# Patient Record
(demographics unavailable — no encounter records)

---

## 2024-11-11 NOTE — PAST MEDICAL HISTORY
[Postmenopausal] : postmenopausal [Menopause Age____] : age at menopause was [unfilled] [Definite ___ (Date)] : the last menstrual period was [unfilled] [Total Preg ___] : G[unfilled] [Full Term ___] : Full Term: [unfilled] [Living ___] : Living: [unfilled] [FreeTextEntry1] : hyysterectomy

## 2024-11-11 NOTE — PHYSICAL EXAM
[No Acute Distress] : no acute distress [Well Nourished] : well nourished [Well Developed] : well developed [Well-Appearing] : well-appearing [Normal Sclera/Conjunctiva] : normal sclera/conjunctiva [PERRL] : pupils equal round and reactive to light [EOMI] : extraocular movements intact [Normal Outer Ear/Nose] : the outer ears and nose were normal in appearance [Normal Oropharynx] : the oropharynx was normal [Normal Nasal Mucosa] : the nasal mucosa was normal [No JVD] : no jugular venous distention [No Lymphadenopathy] : no lymphadenopathy [Supple] : supple [Thyroid Normal, No Nodules] : the thyroid was normal and there were no nodules present [No Respiratory Distress] : no respiratory distress  [No Accessory Muscle Use] : no accessory muscle use [Clear to Auscultation] : lungs were clear to auscultation bilaterally [Normal Rate] : normal rate  [Regular Rhythm] : with a regular rhythm [Normal S1, S2] : normal S1 and S2 [No Murmur] : no murmur heard [No Carotid Bruits] : no carotid bruits [No Abdominal Bruit] : a ~M bruit was not heard ~T in the abdomen [Pedal Pulses Present] : the pedal pulses are present [No Edema] : there was no peripheral edema [No Palpable Aorta] : no palpable aorta [No Extremity Clubbing/Cyanosis] : no extremity clubbing/cyanosis [Declined Breast Exam] : declined breast exam  [Soft] : abdomen soft [Non Tender] : non-tender [Non-distended] : non-distended [No Masses] : no abdominal mass palpated [No HSM] : no HSM [Normal Bowel Sounds] : normal bowel sounds [Declined Rectal Exam] : declined rectal exam [Normal Posterior Cervical Nodes] : no posterior cervical lymphadenopathy [Normal Anterior Cervical Nodes] : no anterior cervical lymphadenopathy [No CVA Tenderness] : no CVA  tenderness [No Spinal Tenderness] : no spinal tenderness [No Joint Swelling] : no joint swelling [Grossly Normal Strength/Tone] : grossly normal strength/tone [No Rash] : no rash [Coordination Grossly Intact] : coordination grossly intact [No Focal Deficits] : no focal deficits [Normal Gait] : normal gait [Deep Tendon Reflexes (DTR)] : deep tendon reflexes were 2+ and symmetric [Speech Grossly Normal] : speech grossly normal [Normal Affect] : the affect was normal [Alert and Oriented x3] : oriented to person, place, and time [Normal Mood] : the mood was normal [Normal Insight/Judgement] : insight and judgment were intact [de-identified] : B/l  cerumen impaction, right  [de-identified] : as per gyn Dr. Vera  [FreeTextEntry1] :  as per gynecology [de-identified] :  as per gynecology

## 2024-11-11 NOTE — ASSESSMENT
[FreeTextEntry1] : Mrs. SALAZAR is a 56 year female, with a past medical history as noted above, who present to the office today for medication renewal and fasting labs

## 2024-11-11 NOTE — HISTORY OF PRESENT ILLNESS
[FreeTextEntry1] : Fasting labs, medication renewal  [de-identified] : Ms. SALAZAR is a 57 year-old  female, with a past medical history as noted below, who present to the office today for fasting blood work.   Doing well overall.  Only concerns is a decrease in her hearing.   Since last visit not on Wegovy and rash and neck pain resolved  Believes she completed Mammo and thyroid sono w/in the year

## 2024-11-11 NOTE — HEALTH RISK ASSESSMENT
[Yes] : Yes [Monthly or less (1 pt)] : Monthly or less (1 point) [1 or 2 (0 pts)] : 1 or 2 (0 points) [Never (0 pts)] : Never (0 points) [No] : In the past 12 months have you used drugs other than those required for medical reasons? No [No falls in past year] : Patient reported no falls in the past year [0] : 2) Feeling down, depressed, or hopeless: Not at all (0) [PHQ-2 Negative - No further assessment needed] : PHQ-2 Negative - No further assessment needed [Never] : Never [Patient reported mammogram was normal] : Patient reported mammogram was normal [Patient reported colonoscopy was normal] : Patient reported colonoscopy was normal [HIV test declined] : HIV test declined [Hepatitis C test declined] : Hepatitis C test declined [None] : None [With Family] : lives with family [# of Members in Household ___] :  household currently consist of [unfilled] member(s) [Employed] : employed [College] : College [] :  [# Of Children ___] : has [unfilled] children [Feels Safe at Home] : Feels safe at home [Fully functional (bathing, dressing, toileting, transferring, walking, feeding)] : Fully functional (bathing, dressing, toileting, transferring, walking, feeding) [Fully functional (using the telephone, shopping, preparing meals, housekeeping, doing laundry, using] : Fully functional and needs no help or supervision to perform IADLs (using the telephone, shopping, preparing meals, housekeeping, doing laundry, using transportation, managing medications and managing finances) [Reports changes in vision] : Reports changes in vision [Smoke Detector] : smoke detector [Carbon Monoxide Detector] : carbon monoxide detector [Seat Belt] :  uses seat belt [de-identified] : GYN  Dr. Vera  [Audit-CScore] : 1 [de-identified] : walker  [de-identified] : Regular  [[id number]] : 0 [Language] : denies difficulty with language [MammogramDate] : 03/23 [MammogramComments] : [facility]  [PapSmearComments] : Dr Vera  [BoneDensityComments] : none  [ColonoscopyDate] : 07/20 [ColonoscopyComments] :  internal hemorrhoids repeat in 5 years  July 2025 [HIVDate] : 06/23 [de-identified] : blurry  [HepatitisCDate] : 06/23

## 2024-11-11 NOTE — PLAN
[FreeTextEntry1] : Cardiopulmonary  -Screening for Hyperlipidemia-check fasting lipid panel.  Will discuss further pending ancillary testing.  Endo   - Adult BMI screening and follow up: Patient was significantly lost since last visit. The patient's BMI is about 20. Counseled patient regarding BMI, healthy eating, portion control and exercise importance of diet to maintain a healthy weight.  Counseled patient on importance of exercise to maintain a healthy weight.  Endocrinology-history of thyroid nodule-continue to monitor thyroid sonogram.  Check TSH free T4.  Patient aware of recommendations. Request copy of sono if completed  ENT b/l cerumen impaction - advised debrox for 5 days than RTO for irrigation   GYN - advised patient follow-up gynecology for routine breast exam and clinical Pap smear.  Also, to follow-up regarding her perimenopausal menopausal symptoms.  Request to have a mammogram report sent to our office.  Gastro - GERD- Advise low acidic diet.    rheum  -  Request recent copy of her bone density scan Dermatology DAWIT was encouraged to wear sun protective clothing, sun block, and proper use of SPF board brim hats, to seek shade and to avoid the sun in peak hours.  ABCD of skin moles was advised.   I spent 31 Minutes with the patient, half of which we discussed finding on physical exam and coordinated care.  As well as reviewed my plans and follow ups. Dragon speech recognition software was used to create portions of this document.  An attempt at proofreading has been made to minimize errors please call if any questions arise.

## 2024-12-13 NOTE — ASSESSMENT
[FreeTextEntry1] : Deidra White presents for evaluation. She has bilateral otitis externa from previous attempted cerumen removal. She also has cellulitis of the left external auditory meatus.  - start ciprodex-  5 drops BID to both ears for 1 week. - mupirocin ointment BID to left external auditory meatus x 10 days - dry ear precautions for 1 week. - f/u prn

## 2024-12-13 NOTE — HISTORY OF PRESENT ILLNESS
[de-identified] : Yolanda White is a 57-year-old female who was referred by SARAH Mirza for initial evaluation of bilateral otalgia, left worse than right. These symptoms occurred after bilateral cerumen impaction removal procedure 2 weeks ago. She had bleeding after this attempted removal. Cotton was placed and this was removed by another urgent care. She continues to have otalgia. She denies otorrhea. She denies hearing loss, tinnitus, or vertigo. She denies recurrent ear infection or recent fever. She denies Q-tip use.

## 2024-12-13 NOTE — PHYSICAL EXAM
[Midline] : trachea located in midline position [Normal] : no rashes [de-identified] : Crusting and erythema of bilateral EAC. Inflammation of left external auditory meatus.

## 2024-12-13 NOTE — CONSULT LETTER
[Dear  ___] : Dear  [unfilled], [Consult Letter:] : I had the pleasure of evaluating your patient, [unfilled]. [Please see my note below.] : Please see my note below. [Consult Closing:] : Thank you very much for allowing me to participate in the care of this patient.  If you have any questions, please do not hesitate to contact me. [Sincerely,] : Sincerely, [FreeTextEntry3] : Steve Cantu MD

## 2025-01-24 NOTE — ADDENDUM
[FreeTextEntry1] : Received endoscopy and colonoscopy report after patient left.  Colonoscopy showed hemorrhoids recommended repeat in 5 years so is due in July 2025\par  \par   endoscopy was done in August 2022  had a gastric polyp removed recommend follow back up with gastroenterology also his consult report recommended following up with pH probe unaware patient has done we will have my nurse reach out to the patient and advised to follow-up with GI and to consider the GI probe for the GERD\par  \par  NT left cerumen impaction advised patient to use DebroxIf no improvement return to office for reevaluation and consider ear irrigation

## 2025-01-29 NOTE — PHYSICAL EXAM
[No Acute Distress] : no acute distress [Well Nourished] : well nourished [Well Developed] : well developed [Well-Appearing] : well-appearing [Normal Sclera/Conjunctiva] : normal sclera/conjunctiva [PERRL] : pupils equal round and reactive to light [EOMI] : extraocular movements intact [Normal Outer Ear/Nose] : the outer ears and nose were normal in appearance [Normal Oropharynx] : the oropharynx was normal [Normal Nasal Mucosa] : the nasal mucosa was normal [No JVD] : no jugular venous distention [No Lymphadenopathy] : no lymphadenopathy [Supple] : supple [Thyroid Normal, No Nodules] : the thyroid was normal and there were no nodules present [No Respiratory Distress] : no respiratory distress  [No Accessory Muscle Use] : no accessory muscle use [Clear to Auscultation] : lungs were clear to auscultation bilaterally [Normal Rate] : normal rate  [Regular Rhythm] : with a regular rhythm [Normal S1, S2] : normal S1 and S2 [No Murmur] : no murmur heard [No Carotid Bruits] : no carotid bruits [No Abdominal Bruit] : a ~M bruit was not heard ~T in the abdomen [Pedal Pulses Present] : the pedal pulses are present [No Edema] : there was no peripheral edema [No Palpable Aorta] : no palpable aorta [No Extremity Clubbing/Cyanosis] : no extremity clubbing/cyanosis [Soft] : abdomen soft [Non Tender] : non-tender [Non-distended] : non-distended [No Masses] : no abdominal mass palpated [No HSM] : no HSM [Normal Bowel Sounds] : normal bowel sounds [Normal Posterior Cervical Nodes] : no posterior cervical lymphadenopathy [Normal Anterior Cervical Nodes] : no anterior cervical lymphadenopathy [No CVA Tenderness] : no CVA  tenderness [No Spinal Tenderness] : no spinal tenderness [No Joint Swelling] : no joint swelling [Grossly Normal Strength/Tone] : grossly normal strength/tone [No Rash] : no rash [Coordination Grossly Intact] : coordination grossly intact [No Focal Deficits] : no focal deficits [Normal Gait] : normal gait [Deep Tendon Reflexes (DTR)] : deep tendon reflexes were 2+ and symmetric [Speech Grossly Normal] : speech grossly normal [Normal Affect] : the affect was normal [Alert and Oriented x3] : oriented to person, place, and time [Normal Mood] : the mood was normal [Normal Insight/Judgement] : insight and judgment were intact [de-identified] :   Left cerumen impaction, right TM panic membrane intact [Normal TMs] : both tympanic membranes were normal [FreeTextEntry1] :  as per gynecology [de-identified] :  as per gynecology

## 2025-01-29 NOTE — DATA REVIEWED
[FreeTextEntry1] : EKG   normal sinus rhythm  reviewed prior labs Reviewed mammogram, breast sonogram Reviewed thyroid sono done 11/23 reviewed abd sono  12/23

## 2025-01-29 NOTE — ASSESSMENT
[FreeTextEntry1] : Ms. SALAZAR is a 57 year-old female, with a past medical history as noted above, who present to the office today for physical exam and evaluation of abdominal pain

## 2025-01-29 NOTE — REVIEW OF SYSTEMS
[Hot Flashes] : hot flashes [Night Sweats] : night sweats [Vision Problems] : vision problems [Cough] : cough [Nocturia] : nocturia [Easy Bleeding] : easy bleeding [Negative] : Heme/Lymph [Fever] : no fever [Chills] : no chills [Fatigue] : no fatigue [Pain] : no pain [Itching] : no itching [Earache] : no earache [Nosebleed] : no nosebleeds [Nasal Discharge] : no nasal discharge [Sore Throat] : no sore throat [Chest Pain] : no chest pain [Palpitations] : no palpitations [Leg Claudication] : no leg claudication [Lower Ext Edema] : no lower extremity edema [Orthopnea] : no orthopnea [Shortness Of Breath] : no shortness of breath [Wheezing] : no wheezing [Abdominal Pain] : abdominal pain [Nausea] : no nausea [Constipation] : no constipation [Diarrhea] : diarrhea [Vomiting] : no vomiting [Heartburn] : no heartburn [Melena] : no melena [Dysuria] : no dysuria [Incontinence] : no incontinence [Hematuria] : no hematuria [Frequency] : no frequency [Joint Stiffness] : no joint stiffness [Joint Swelling] : no joint swelling [Muscle Pain] : no muscle pain [Back Pain] : no back pain [Itching] : no itching [Mole Changes] : no mole changes [Skin Rash] : no skin rash [Headache] : no headache [Dizziness] : no dizziness [Fainting] : no fainting [Confusion] : no confusion [Memory Loss] : no memory loss [Anxiety] : no anxiety [Depression] : no depression [Easy Bruising] : no easy bruising [Swollen Glands] : no swollen glands [FreeTextEntry6] : slight cough

## 2025-01-29 NOTE — PLAN
[FreeTextEntry1] : Cardiopulmonary  -Screening for coronary artery disease -     We reviewed and discussed the EKG.   Patient was advised the importance of participating in aerobic exercise programs improve their stamina.  DAWIT was encouraged to start an exercise program.   Check fasting lipid panel.  5 minutes were spent administering an evidence-based ASCVD risk assessment tool showing patient's risk being calculated as 1.56% and discussed the results with the patient including lifestyle modifications to be taken as well as treatment options with statin medications.   Endo   - Adult BMI screening and followup:  The patient's BMI is about 21. Counseled patient regarding BMI, healthy eating, portion control and exercise importance of diet to maintain a healthy weight.  Counseled patient on importance of exercise to maintain a healthy weight. d/c ozempic  advised BMI normal and concerns abd pain from GLP  Hematology  -  low vitamin B12 on PPIs intermittently.   check vitamin B12 level.  Advised to take over-the-counter vitamin B12  Endo   Hypothyroidism-check thyroid panel.  Check thyroid sonogram.  GYN -  advised patient follow-up gynecology for routine breast exam and clinical Pap smear.  Also to follow-up regarding her perimenopausal menopausal symptoms. Check mammogram yearly  Rheum - Evaluation for osteoporosis - Check BS - Advised weight bearing exercise - Check vitamin d levels    -  hematuria check urine culture and we will discuss further pending ancillary testing if urine culture is negative consider renal sonogram. GI -   Reviewed colonoscopy  Advised repeat summer of 2025 - Follow up with GI  Gastro - GERD Given the Patient Back As Reflux.  Should Avoid Alcohol as well as Anti-Inflammatories.  Avoid Eating 3 Hours Prior to Laying down.  Should Also Avoid Citric Juices, Cigarettes, Chocolate, Tight fitting Clothing, Coffee, and Other Caffeinated Beverages, Carbonated Beverages, Fatty and Fried Foods, Anticholinergic Medications, Medications with Decrease LES Tone like Calcium Channel Blockers.   discussed the use of PPI versus H2 blockers.   Follow up with GI Refilled lansoprazole   Rheum  -   Screening for osteoporosis.  Patient given a prescription for bone density scan.  advised importance of calcium and vitamin D as well as weightbearing exercise.  Dermatology DAWIT was encouraged to wear sun protective clothing, sun block, and proper use of SPF board brim hats, to seek shade and to avoid the sun in peak hours.  ABCD of skin moles was advised.   Dragon speech recognition software was used to create portions of this document.  An attempt at proofreading has been made to minimize errors please call if any questions arise.   Shingrix - discussed, patient to determine if vaccine is covered under medical benefits of current insurance plan and follow up for 1st dose if interested; otherwise, instructed to follow up at the pharmacy for vaccine   patient unaware if she had the chickenpox or  Varivax vaccine check varicella IgG

## 2025-01-29 NOTE — COUNSELING
[AUDIT-C Screening administered and reviewed] : AUDIT-C Screening administered and reviewed [Encouraged to increase physical activity] : Encouraged to increase physical activity [Decrease Portions] : decrease portions [Good understanding] : Patient has a good understanding of disease, goals and obesity follow-up plan [FreeTextEntry2] :  discussed a low caloric diet

## 2025-01-29 NOTE — HISTORY OF PRESENT ILLNESS
[FreeTextEntry1] : Physical exam  [de-identified] : Mrs. SALAZAR is a 57-year-old-female, with a past medical history as noted below, who present to the office today for physical. States she been having abdominal pain.  Pain mostly upper abdomen - Unaware of what makes it better or worse. Denies having any blood in the stools. Denies change in BM.  Was on GLP! for weight loss

## 2025-01-29 NOTE — PHYSICAL EXAM
[No Acute Distress] : no acute distress [Well Nourished] : well nourished [Well Developed] : well developed [Well-Appearing] : well-appearing [Normal Sclera/Conjunctiva] : normal sclera/conjunctiva [PERRL] : pupils equal round and reactive to light [EOMI] : extraocular movements intact [Normal Outer Ear/Nose] : the outer ears and nose were normal in appearance [Normal Oropharynx] : the oropharynx was normal [Normal Nasal Mucosa] : the nasal mucosa was normal [No JVD] : no jugular venous distention [No Lymphadenopathy] : no lymphadenopathy [Supple] : supple [Thyroid Normal, No Nodules] : the thyroid was normal and there were no nodules present [No Respiratory Distress] : no respiratory distress  [No Accessory Muscle Use] : no accessory muscle use [Clear to Auscultation] : lungs were clear to auscultation bilaterally [Normal Rate] : normal rate  [Regular Rhythm] : with a regular rhythm [Normal S1, S2] : normal S1 and S2 [No Murmur] : no murmur heard [No Carotid Bruits] : no carotid bruits [No Abdominal Bruit] : a ~M bruit was not heard ~T in the abdomen [Pedal Pulses Present] : the pedal pulses are present [No Edema] : there was no peripheral edema [No Palpable Aorta] : no palpable aorta [No Extremity Clubbing/Cyanosis] : no extremity clubbing/cyanosis [Soft] : abdomen soft [Non Tender] : non-tender [Non-distended] : non-distended [No Masses] : no abdominal mass palpated [No HSM] : no HSM [Normal Bowel Sounds] : normal bowel sounds [Normal Posterior Cervical Nodes] : no posterior cervical lymphadenopathy [Normal Anterior Cervical Nodes] : no anterior cervical lymphadenopathy [No CVA Tenderness] : no CVA  tenderness [No Spinal Tenderness] : no spinal tenderness [No Joint Swelling] : no joint swelling [Grossly Normal Strength/Tone] : grossly normal strength/tone [No Rash] : no rash [Coordination Grossly Intact] : coordination grossly intact [No Focal Deficits] : no focal deficits [Normal Gait] : normal gait [Deep Tendon Reflexes (DTR)] : deep tendon reflexes were 2+ and symmetric [Speech Grossly Normal] : speech grossly normal [Normal Affect] : the affect was normal [Alert and Oriented x3] : oriented to person, place, and time [Normal Mood] : the mood was normal [Normal Insight/Judgement] : insight and judgment were intact [de-identified] :   Left cerumen impaction, right TM panic membrane intact [Normal TMs] : both tympanic membranes were normal [FreeTextEntry1] :  as per gynecology [de-identified] :  as per gynecology

## 2025-01-29 NOTE — HEALTH RISK ASSESSMENT
[Fair] : ~his/her~ current health as fair  [Good] : ~his/her~  mood as  good [No Retinopathy] : No retinopathy [Yes] : Yes [Monthly or less (1 pt)] : Monthly or less (1 point) [1 or 2 (0 pts)] : 1 or 2 (0 points) [Never (0 pts)] : Never (0 points) [No] : In the past 12 months have you used drugs other than those required for medical reasons? No [0] : 2) Feeling down, depressed, or hopeless: Not at all (0) [PHQ-2 Negative - No further assessment needed] : PHQ-2 Negative - No further assessment needed [Never] : Never [Patient reported mammogram was normal] : Patient reported mammogram was normal [Patient reported colonoscopy was normal] : Patient reported colonoscopy was normal [HIV test declined] : HIV test declined [Hepatitis C test declined] : Hepatitis C test declined [With Family] : lives with family [College] : College [] :  [# Of Children ___] : has [unfilled] children [Feels Safe at Home] : Feels safe at home [Carbon Monoxide Detector] : carbon monoxide detector [None] : None [# of Members in Household ___] :  household currently consist of [unfilled] member(s) [Employed] : employed [Fully functional (bathing, dressing, toileting, transferring, walking, feeding)] : Fully functional (bathing, dressing, toileting, transferring, walking, feeding) [Fully functional (using the telephone, shopping, preparing meals, housekeeping, doing laundry, using] : Fully functional and needs no help or supervision to perform IADLs (using the telephone, shopping, preparing meals, housekeeping, doing laundry, using transportation, managing medications and managing finances) [Smoke Detector] : smoke detector [Seat Belt] :  uses seat belt [One fall no injury in past year] : Patient reported one fall in the past year without injury [FreeTextEntry1] : Abdominal pain  [de-identified] : GYN  Dr. Chuy GÓMEZ  [Audit-CScore] : 1 [de-identified] : walker  [de-identified] : Regular  [de-identified] : slipped don ICE  LBP [[id number]] : 0 [EyeExamDate] : 09/22 [Language] : denies difficulty with language [MammogramDate] : 09/24 [MammogramComments] : [facility]  [PapSmearComments] : Dr Vera  [BoneDensityComments] : none  [ColonoscopyDate] : 07/20 [ColonoscopyComments] :  internal hemorrhoids repeat in 5 years  July 2025 [HIVDate] : 06/23 [HepatitisCDate] : 06/23  [de-identified] : blurry

## 2025-01-29 NOTE — HEALTH RISK ASSESSMENT
[Fair] : ~his/her~ current health as fair  [Good] : ~his/her~  mood as  good [No Retinopathy] : No retinopathy [Yes] : Yes [Monthly or less (1 pt)] : Monthly or less (1 point) [1 or 2 (0 pts)] : 1 or 2 (0 points) [Never (0 pts)] : Never (0 points) [No] : In the past 12 months have you used drugs other than those required for medical reasons? No [0] : 2) Feeling down, depressed, or hopeless: Not at all (0) [PHQ-2 Negative - No further assessment needed] : PHQ-2 Negative - No further assessment needed [Never] : Never [Patient reported mammogram was normal] : Patient reported mammogram was normal [Patient reported colonoscopy was normal] : Patient reported colonoscopy was normal [HIV test declined] : HIV test declined [Hepatitis C test declined] : Hepatitis C test declined [With Family] : lives with family [College] : College [] :  [# Of Children ___] : has [unfilled] children [Feels Safe at Home] : Feels safe at home [Carbon Monoxide Detector] : carbon monoxide detector [None] : None [# of Members in Household ___] :  household currently consist of [unfilled] member(s) [Employed] : employed [Fully functional (bathing, dressing, toileting, transferring, walking, feeding)] : Fully functional (bathing, dressing, toileting, transferring, walking, feeding) [Fully functional (using the telephone, shopping, preparing meals, housekeeping, doing laundry, using] : Fully functional and needs no help or supervision to perform IADLs (using the telephone, shopping, preparing meals, housekeeping, doing laundry, using transportation, managing medications and managing finances) [Smoke Detector] : smoke detector [Seat Belt] :  uses seat belt [One fall no injury in past year] : Patient reported one fall in the past year without injury [FreeTextEntry1] : Abdominal pain  [de-identified] : GYN  Dr. Chuy GÓMEZ  [Audit-CScore] : 1 [de-identified] : walker  [de-identified] : Regular  [de-identified] : slipped don ICE  LBP [[id number]] : 0 [EyeExamDate] : 09/22 [Language] : denies difficulty with language [MammogramDate] : 09/24 [MammogramComments] : [facility]  [PapSmearComments] : Dr Vera  [BoneDensityComments] : none  [ColonoscopyDate] : 07/20 [ColonoscopyComments] :  internal hemorrhoids repeat in 5 years  July 2025 [HIVDate] : 06/23 [HepatitisCDate] : 06/23  [de-identified] : blurry

## 2025-01-29 NOTE — HISTORY OF PRESENT ILLNESS
[FreeTextEntry1] : Physical exam  [de-identified] : Mrs. SALAZAR is a 57-year-old-female, with a past medical history as noted below, who present to the office today for physical. States she been having abdominal pain.  Pain mostly upper abdomen - Unaware of what makes it better or worse. Denies having any blood in the stools. Denies change in BM.  Was on GLP! for weight loss

## 2025-02-27 NOTE — ASSESSMENT
[FreeTextEntry1] : Assessment:  57-year-old woman, with history significant for Hashimoto's thyroiditis and hypothyroidism on Synthroid, presents with a heterogeneous thyroid containing a subcentimeter calcified right thyroid nodule.  Plan: - The nature of thyroid nodules, as well as the indications for biopsy and thyroidectomy, were discussed with Ms. White and I explained that a biopsy is recommended for hypoechoic solid thyroid nodules > 1 cm in diameter, isoechoic, hyperechoic, and complex thyroid nodules > 1.5 cm in diameter, and particularly of nodules that are contain suspicious features (i.e., appear taller-than-wide and/or contain calcifications).  Based upon these guidelines and her recent ultrasound, I explained that the reported left mid 1.3 cm taller-than-wide isoechoic solid thyroid nodule met RUPESH criteria for a biopsy. - I then explained that on today's in-office ultrasound, she was noted to have a heterogeneous thyroid containing a subcentimeter calcified right thyroid nodule and that the reported bilateral thyroid nodules were not appreciated.  In light of this imaging discrepancy, I have recommended that she have a formal repeat neck ultrasound at [facility]. - Management options of her subcentimeter calcified right thyroid nodule were then discussed including observation versus attempting a FNA biopsy.  The risks associated with active surveillance (viz., disease progression and possible metastasis) as well as the limitations of attempting to biopsy such a small and calcified nodule were discussed. - Finally, will check labs today including baseline thyroid antibodies, iPTH, and coags. - The patient expressed understanding of the above and opted to first have a repeat neck ultrasound at [facility].  She was instructed to follow-up after she has the ultrasound to review all results and further management.

## 2025-02-27 NOTE — PHYSICAL EXAM
[Midline] : located in midline position [Normal] : orientation to person, place, and time: normal [de-identified] : The neck appears flat.  There are no palpable masses.  A limited in-office ultrasound revealed a heterogeneous thyroid with an approximately 0.3 cm right upper pole calcification with shadowing.  The reported bilateral thyroid nodules were not appreciated.  [de-identified] : Extremities: NEVES x 4.   Skin: No obvious skin lesions.   Voice: clear

## 2025-02-27 NOTE — PROCEDURE
[TextEntry] : After obtaining informed consent and application of a topical cooling spray, an ultrasound-guided FNA biopsy was performed on the left mid 1.3 cm taller-than-wide isoechoic solid thyroid nodule using one pass of a 25-gauge needle followed by one pass of a 22-gauge needle.  A drop from each pass was placed into a ThyroSeq tube to potentially be sent for ThyroSeq.  A drop from the first pass was placed onto a slide which was smeared and fixed in alcohol.  A drop from the second pass was placed onto a slide which was smeared and air dried.  All remaining aspirated material was washed into CytoLyt.  She tolerated the procedure well.

## 2025-02-27 NOTE — HISTORY OF PRESENT ILLNESS
[de-identified] : Ms. White is a 57-year-old woman, with history significant for Hashimoto's thyroiditis and hypothyroidism on Synthroid, who presents for initial evaluation of thyroid nodules.  She states that she has been on Synthroid for the past 20 years and has had several neck ultrasounds.  Her most recent thyroid ultrasound, performed 01/24/2025 ([facility]), reported a right upper 1.1 cm isoechoic solid thyroid nodule and a left mid 1.3 cm taller-than-wide isoechoic solid thyroid nodule.  The lateral neck was not reported.  Review of her prior ultrasound reports at [facility] had reported a heterogenous thyroid without discrete nodules.  She states that her sister has Hashimoto's, however she denies known family history of thyroid cancer or personal history of dysphagia or recent voice changes.  Labs performed 01/24/2025 revealed an elevated TPO Ab = 165, TG Ab = 16.1, TSH = 1.42, and serum calcium = 9.5.

## 2025-03-26 NOTE — ASSESSMENT
[FreeTextEntry1] : Assessment: 58-year-old woman, with history significant for Hashimoto's thyroiditis and hypothyroidism on Synthroid, with bilateral thyroid nodules including a possible subcentimeter right thyroid calcification.   Plan: - The recent follow-up neck ultrasound and labs were reviewed with Ms. White.  The indications for biopsy were then reviewed and I explained that her currently reported thyroid nodules do not meet RUPESH or TIRADS criteria for a biopsy.  I then explained that the suspected right-sided subcentimeter calcification seen on in-office ultrasound was not reported.  I have therefore recommended observation with a repeat neck ultrasound after six months (September, 2025).   - Finally, I have recommended that she follow-up with her PCP for management of her mildly elevated PTT. - The patient expressed understanding of the above and agrees with this plan.  She was instructed to follow-up in September.  Will arrange for her to have a follow-up ultrasound prior to her appointment such that we can review the results and further management at that time.

## 2025-03-26 NOTE — HISTORY OF PRESENT ILLNESS
[de-identified] : Ms. White presents for follow-up.  The repeat neck ultrasound, performed 03/09/2025 ([facility]), reported a heterogeneous thyroid with a right upper 1.0 cm isoechoic solid thyroid nodule and a left lower 1.3 cm heterogeneous thyroid nodule.  There was no lymphadenopathy appreciated.  The suspected right-sided subcentimeter calcification seen on in-office ultrasound was not reported.  Labs performed 02/27/2025 revealed a serum calcium = 8.7, iPTH = 49, a mildly decreased 25-OH vitamin D = 24.2, normal PT/INR, and a mildly elevated PTT = 37.3.

## 2025-03-26 NOTE — REASON FOR VISIT
[Telephone (audio)] : This telephonic visit was provided via audio only technology. [Verbal consent obtained from patient] : the patient, [unfilled] [Follow-Up: _____] : a [unfilled] follow-up visit

## 2025-03-28 NOTE — CONSULT LETTER
[FreeTextEntry3] : Lynn Freeman M.D. [facility] - Rheumatology   -[address] [place] [id number]  -[address] [place] [address] , [place] [address]   Telephone: [phone] Fax: [phone]

## 2025-03-28 NOTE — HISTORY OF PRESENT ILLNESS
[FreeTextEntry1] : Here today in initial consultation for osteoporosis Was diagnosed with osteoporosis recently, she had her first bone density in January No history of fractures or prior treatment She does not take calcium and vitamin D supplements She exercises regularly including Pilates She had her hysterectomy in her 40s and has been in menopause since She has a history of Hashimoto's thyroiditis and is on Synthroid She has a history of GERD/Jones's esophagus and is on a PPI No history of rheumatoid arthritis, steroid use, seizure medication use, lactose intolerance or nephrolithiasis No dental issues

## 2025-03-28 NOTE — CONSULT LETTER
[FreeTextEntry3] : Lynn Freeman M.D. [facility] - Rheumatology   -[address] [place] [id number]  -[address] [place] [address] , [place] [address]   Telephone: [phone] Fax: [phone]

## 2025-03-28 NOTE — REVIEW OF SYSTEMS
[TextEntry] : Constitutional: No fatigue, no fevers, chills, night sweats.  No frequent infections Eyes: No dry eyes, eye pain, eye redness, visual symptoms ENT: No dry mouth, no nasal or oral ulcers CVS: No chest pain RS: No shortness of breath, no pleuritic pain MSK:  No joint pain, swelling or stiffness Integumentary: No skin rashes, no malar rash.  No Raynaud's.  No hair loss GI: No abdominal pain, blood in the stool, nausea vomiting, constipation or diarrhea : No urinary symptoms, blood in urine Neurology: Negative Heme/lymph: No swollen glands or lymph nodes No history of blood clots

## 2025-03-28 NOTE — PHYSICAL EXAM
[TextEntry] : Constitutional: Alert, no acute distress Eyes: Normal sclera and conjunctiva ENT: Normal outer ear, nose.  Normal oropharynx Neck: Normal appearance, supple RS: Clear to auscultation bilaterally CVS: S1, S2 + Vascular: No edema Musculoskeletal: Good range of motion of all joints with no joint line tenderness or effusion Skin: No rashes Neuro: No focal deficits

## 2025-03-28 NOTE — ASSESSMENT
[FreeTextEntry1] : 58-year-old female here today in initial consultation for osteoporosis  Osteoporosis: - DEXA from 1/28/2025:  L1-L4 T-score -3.4, left femoral neck T-score -1.9, left total hip T-score -2.2, right femoral neck T-score -2.5, right total hip T-score -2.7 -No history of fractures -Lifestyle modifications including adequate intake of calcium, vitamin D and exercise reviewed.  Literature on osteoporosis given to review -Start calcium and vitamin D supplementation -Check secondary osteoporosis workup -No dental issues/dental work planned - Medication options reviewed in detail with patient - Would avoid oral bisphosphonates given history of GERD/Jones's esophagus - Trial of Prolia 60 mg subcutaneously every 6 months, side effects reviewed.  Patient agreeable.  Literature on Prolia given to review  All questions and concerns addressed to my best possible ability, patient verbalizes understanding and is agreeable.  Follow-up for Prolia injection as soon as medication obtained, sooner as needed

## 2025-06-18 NOTE — HEALTH RISK ASSESSMENT
[de-identified] : GYN  Dr. Chuy GÓMEZ  [Audit-CScore] : 1 [de-identified] : walker  [de-identified] : Regular  [de-identified] : slipped don ICE  LBP [[id number]] : 0

## 2025-06-18 NOTE — DATA REVIEWED
[FreeTextEntry1] : reviewed Rheum consult - reviewed recommendation for Prolia injections reviewed labs done by endo

## 2025-06-18 NOTE — REVIEW OF SYSTEMS
[Fever] : no fever [Chills] : no chills [Recent Change In Weight] : ~T no recent weight change [Discharge] : no discharge [Pain] : no pain [Vision Problems] : no vision problems [Itching] : no itching [Earache] : no earache [Nosebleed] : no nosebleeds [Nasal Discharge] : no nasal discharge [Sore Throat] : no sore throat [Chest Pain] : no chest pain [Leg Claudication] : no leg claudication [Palpitations] : no palpitations [Lower Ext Edema] : no lower extremity edema [Orthopnea] : no orthopnea [Shortness Of Breath] : no shortness of breath [Wheezing] : no wheezing [Cough] : no cough [Dyspnea on Exertion] : no dyspnea on exertion [Abdominal Pain] : no abdominal pain [Nausea] : no nausea [Diarrhea] : diarrhea [Constipation] : no constipation [Vomiting] : no vomiting [Heartburn] : no heartburn [Melena] : no melena [Dysuria] : no dysuria [Incontinence] : no incontinence [Nocturia] : no nocturia [Hematuria] : no hematuria [Frequency] : no frequency [Vaginal Discharge] : no vaginal discharge [Joint Pain] : no joint pain [Joint Stiffness] : no joint stiffness [Joint Swelling] : no joint swelling [Muscle Pain] : no muscle pain [Back Pain] : no back pain [Itching] : no itching [Mole Changes] : no mole changes [Skin Rash] : no skin rash [Headache] : no headache [Dizziness] : no dizziness [Fainting] : no fainting [Confusion] : no confusion [Memory Loss] : no memory loss [Unsteady Walking] : no ataxia [Anxiety] : no anxiety [Depression] : no depression [Easy Bruising] : no easy bruising [Swollen Glands] : no swollen glands [FreeTextEntry4] : sinus pressure

## 2025-06-18 NOTE — PHYSICAL EXAM
[de-identified] : clear  no sinus tenderness noted - Normal transillumination   [FreeTextEntry1] :  as per gynecology [de-identified] :  as per gynecology

## 2025-06-18 NOTE — ASSESSMENT
[FreeTextEntry1] : Mrs. SALAZAR is a 58 year-old female, with a past medical history as noted above, who present to the office today for follow up on from Rheum and endo. Evaluation for sinus pressure and fatigue

## 2025-06-18 NOTE — REVIEW OF SYSTEMS
[Fever] : no fever [Chills] : no chills [Recent Change In Weight] : ~T no recent weight change [Discharge] : no discharge [Pain] : no pain [Vision Problems] : no vision problems [Itching] : no itching [Earache] : no earache [Nosebleed] : no nosebleeds [Nasal Discharge] : no nasal discharge [Sore Throat] : no sore throat [Chest Pain] : no chest pain [Palpitations] : no palpitations [Leg Claudication] : no leg claudication [Lower Ext Edema] : no lower extremity edema [Orthopnea] : no orthopnea [Shortness Of Breath] : no shortness of breath [Wheezing] : no wheezing [Cough] : no cough [Dyspnea on Exertion] : no dyspnea on exertion [Abdominal Pain] : no abdominal pain [Nausea] : no nausea [Diarrhea] : diarrhea [Constipation] : no constipation [Vomiting] : no vomiting [Heartburn] : no heartburn [Melena] : no melena [Dysuria] : no dysuria [Incontinence] : no incontinence [Nocturia] : no nocturia [Hematuria] : no hematuria [Frequency] : no frequency [Vaginal Discharge] : no vaginal discharge [Joint Pain] : no joint pain [Joint Stiffness] : no joint stiffness [Joint Swelling] : no joint swelling [Muscle Pain] : no muscle pain [Back Pain] : no back pain [Itching] : no itching [Mole Changes] : no mole changes [Skin Rash] : no skin rash [Headache] : no headache [Dizziness] : no dizziness [Fainting] : no fainting [Confusion] : no confusion [Memory Loss] : no memory loss [Unsteady Walking] : no ataxia [Anxiety] : no anxiety [Depression] : no depression [Easy Bruising] : no easy bruising [Swollen Glands] : no swollen glands [FreeTextEntry4] : sinus pressure

## 2025-06-18 NOTE — PLAN
[FreeTextEntry1] : ENT - Sinus pressure - did not appreciate Sinusitis - Advised OTC antihistamine, and nasal spray. Check CBC  Endo   - Adult BMI screening and follow-up:  The patient's BMI is about 20. Counseled patient regarding BMI, healthy eating, portion control and exercise importance of diet to maintain a healthy weight.  Counseled patient on importance of exercise to maintain a healthy weight.  Osteoporosis- Continue with weight bearing exercise  Discussed prolia - PT requesting second opinion. Referral placed    Hematology -  low vitamin B12.  Vitamin B12  1ml injected Right deltoid IM lot [id number]   exp   check vitamin B12 level.  Advised to take over-the-counter vitamin B12  Endo   Hypothyroidism-check TSH free t4  GYN - advised patient follow-up gynecology for routine breast exam and clinical Pap smear.  Check mammogram yearly  Rheum - OB with a significant bone loss l-s spine - Advised to see Rheum   GI - Has an appointment for colonoscopy and endoscopy 07/2025  Gastro - GERD Given the Patient Back As Reflux.  Should Avoid Alcohol as well as Anti-Inflammatories.  Avoid Eating 3 Hours Prior to Laying down.  Should Also Avoid Citric Juices, Cigarettes, Chocolate, Tight fitting Clothing, Coffee, and Other Caffeinated Beverages, Carbonated Beverages, Fatty and Fried Foods, Anticholinergic Medications, Medications with Decrease LES Tone like Calcium Channel Blockers.   discussed the use of PPI versus H2 blockers.   Follow up with GI  Dermatology DAWIT was encouraged to wear sun protective clothing, sun block, and proper use of SPF board brim hats, to seek shade and to avoid the sun in peak hours.  ABCD of skin moles was advised.   I spent 32  Minutes with the patient, half of which we discussed finding on physical exam and coordinated care.  As well as reviewed my plans and follow ups. Dragon speech recognition software was used to create portions of this document.  An attempt at proofreading has been made to minimize errors please call if any questions arise.  Check labs  cbc, cmp, vitamin d, vitamin b12 , TSH- free t4

## 2025-06-18 NOTE — PHYSICAL EXAM
[de-identified] : clear  no sinus tenderness noted - Normal transillumination   [FreeTextEntry1] :  as per gynecology [de-identified] :  as per gynecology

## 2025-06-18 NOTE — HISTORY OF PRESENT ILLNESS
[FreeTextEntry1] : Fatigue - Follow up from Rheum and Endo  [de-identified] : Mrs. SALAZAR is a 58-year-old-female, with a past medical history as noted below, who present to the office today for fatigue. Unaware of what makes it better or worse.  States last week she thinks she had a sinus infection. Feeling better today. Denies fever or chill. States she had sinus pressure and congestion.  Deidra followed up with Rheumatology about her BDS - States she was supposed to get a phone call to see if she was approved for the injection - States she never heard back from the Dr. States she would like a second opinion. States she is doing a lot of Pilates  Did follow up with Endo - Will continue to monitor thyroid sonogram. HX vitamin B12 deficiency in the past did well w/ injections. Going for endoscopy and colonoscopy in a few weeks

## 2025-06-18 NOTE — HEALTH RISK ASSESSMENT
[de-identified] : GYN  Dr. Chuy GÓMEZ  [Audit-CScore] : 1 [de-identified] : walker  [de-identified] : Regular  [de-identified] : slipped don ICE  LBP [[id number]] : 0

## 2025-06-18 NOTE — HISTORY OF PRESENT ILLNESS
[FreeTextEntry1] : Fatigue - Follow up from Rheum and Endo  [de-identified] : Mrs. SALAZAR is a 58-year-old-female, with a past medical history as noted below, who present to the office today for fatigue. Unaware of what makes it better or worse.  States last week she thinks she had a sinus infection. Feeling better today. Denies fever or chill. States she had sinus pressure and congestion.  Deidra followed up with Rheumatology about her BDS - States she was supposed to get a phone call to see if she was approved for the injection - States she never heard back from the Dr. States she would like a second opinion. States she is doing a lot of Pilates  Did follow up with Endo - Will continue to monitor thyroid sonogram. HX vitamin B12 deficiency in the past did well w/ injections. Going for endoscopy and colonoscopy in a few weeks